# Patient Record
Sex: MALE | Race: WHITE | NOT HISPANIC OR LATINO | Employment: FULL TIME | URBAN - METROPOLITAN AREA
[De-identification: names, ages, dates, MRNs, and addresses within clinical notes are randomized per-mention and may not be internally consistent; named-entity substitution may affect disease eponyms.]

---

## 2022-07-18 ENCOUNTER — HOSPITAL ENCOUNTER (OUTPATIENT)
Dept: CARDIOLOGY | Facility: HOSPITAL | Age: 52
Discharge: HOME | End: 2022-07-18

## 2022-07-18 ENCOUNTER — TRANSCRIBE ORDERS (OUTPATIENT)
Dept: ADMINISTRATIVE | Facility: HOSPITAL | Age: 52
End: 2022-07-18

## 2022-07-18 ENCOUNTER — LAB (OUTPATIENT)
Dept: LAB | Facility: HOSPITAL | Age: 52
End: 2022-07-18

## 2022-07-18 DIAGNOSIS — Z01.818 PRE-OP TESTING: ICD-10-CM

## 2022-07-18 DIAGNOSIS — Z01.818 PRE-OP TESTING: Primary | ICD-10-CM

## 2022-07-18 LAB
ANION GAP SERPL CALCULATED.3IONS-SCNC: 12.4 MMOL/L (ref 5–15)
BUN SERPL-MCNC: 10 MG/DL (ref 6–20)
BUN/CREAT SERPL: 12.2 (ref 7–25)
CALCIUM SPEC-SCNC: 9.7 MG/DL (ref 8.6–10.5)
CHLORIDE SERPL-SCNC: 100 MMOL/L (ref 98–107)
CO2 SERPL-SCNC: 25.6 MMOL/L (ref 22–29)
CREAT SERPL-MCNC: 0.82 MG/DL (ref 0.76–1.27)
EGFRCR SERPLBLD CKD-EPI 2021: 105 ML/MIN/1.73
GLUCOSE SERPL-MCNC: 151 MG/DL (ref 65–99)
POTASSIUM SERPL-SCNC: 4.2 MMOL/L (ref 3.5–5.2)
SODIUM SERPL-SCNC: 138 MMOL/L (ref 136–145)

## 2022-07-18 PROCEDURE — 93010 ELECTROCARDIOGRAM REPORT: CPT | Performed by: INTERNAL MEDICINE

## 2022-07-18 PROCEDURE — 80048 BASIC METABOLIC PNL TOTAL CA: CPT

## 2022-07-18 PROCEDURE — 36415 COLL VENOUS BLD VENIPUNCTURE: CPT

## 2022-07-18 PROCEDURE — 93005 ELECTROCARDIOGRAM TRACING: CPT | Performed by: ORTHOPAEDIC SURGERY

## 2022-07-19 PROCEDURE — 93005 ELECTROCARDIOGRAM TRACING: CPT | Performed by: ORTHOPAEDIC SURGERY

## 2022-07-20 LAB — QT INTERVAL: 335 MS

## 2023-02-02 ENCOUNTER — APPOINTMENT (OUTPATIENT)
Dept: GENERAL RADIOLOGY | Facility: HOSPITAL | Age: 53
End: 2023-02-02
Payer: COMMERCIAL

## 2023-02-02 ENCOUNTER — HOSPITAL ENCOUNTER (EMERGENCY)
Facility: HOSPITAL | Age: 53
Discharge: HOME | End: 2023-02-02
Attending: EMERGENCY MEDICINE | Admitting: EMERGENCY MEDICINE
Payer: COMMERCIAL

## 2023-02-02 VITALS
SYSTOLIC BLOOD PRESSURE: 147 MMHG | BODY MASS INDEX: 28.93 KG/M2 | RESPIRATION RATE: 18 BRPM | HEART RATE: 70 BPM | DIASTOLIC BLOOD PRESSURE: 86 MMHG | TEMPERATURE: 97 F | WEIGHT: 180 LBS | HEIGHT: 66 IN | OXYGEN SATURATION: 100 %

## 2023-02-02 DIAGNOSIS — M54.41 ACUTE RIGHT-SIDED LOW BACK PAIN WITH RIGHT-SIDED SCIATICA: Primary | ICD-10-CM

## 2023-02-02 LAB
ANION GAP SERPL CALCULATED.3IONS-SCNC: 12 MMOL/L (ref 5–15)
BASOPHILS # BLD AUTO: 0.1 10*3/MM3 (ref 0–0.2)
BASOPHILS NFR BLD AUTO: 1 % (ref 0–1.5)
BILIRUB UR QL STRIP: NEGATIVE
BUN SERPL-MCNC: 13 MG/DL (ref 6–20)
BUN/CREAT SERPL: 17.1 (ref 7–25)
CALCIUM SPEC-SCNC: 9.3 MG/DL (ref 8.6–10.5)
CHLORIDE SERPL-SCNC: 105 MMOL/L (ref 98–107)
CLARITY UR: CLEAR
CO2 SERPL-SCNC: 20 MMOL/L (ref 22–29)
COLOR UR: YELLOW
CREAT SERPL-MCNC: 0.76 MG/DL (ref 0.76–1.27)
DEPRECATED RDW RBC AUTO: 41.6 FL (ref 37–54)
EGFRCR SERPLBLD CKD-EPI 2021: 107.5 ML/MIN/1.73
EOSINOPHIL # BLD AUTO: 0.1 10*3/MM3 (ref 0–0.4)
EOSINOPHIL NFR BLD AUTO: 1.1 % (ref 0.3–6.2)
ERYTHROCYTE [DISTWIDTH] IN BLOOD BY AUTOMATED COUNT: 12.5 % (ref 12.3–15.4)
GLUCOSE SERPL-MCNC: 133 MG/DL (ref 65–99)
GLUCOSE UR STRIP-MCNC: ABNORMAL MG/DL
HCT VFR BLD AUTO: 50.6 % (ref 37.5–51)
HGB BLD-MCNC: 16.9 G/DL (ref 13–17.7)
HGB UR QL STRIP.AUTO: NEGATIVE
KETONES UR QL STRIP: ABNORMAL
LEUKOCYTE ESTERASE UR QL STRIP.AUTO: NEGATIVE
LYMPHOCYTES # BLD AUTO: 3.1 10*3/MM3 (ref 0.7–3.1)
LYMPHOCYTES NFR BLD AUTO: 32.4 % (ref 19.6–45.3)
MCH RBC QN AUTO: 31.5 PG (ref 26.6–33)
MCHC RBC AUTO-ENTMCNC: 33.4 G/DL (ref 31.5–35.7)
MCV RBC AUTO: 94.3 FL (ref 79–97)
MONOCYTES # BLD AUTO: 0.6 10*3/MM3 (ref 0.1–0.9)
MONOCYTES NFR BLD AUTO: 5.9 % (ref 5–12)
NEUTROPHILS NFR BLD AUTO: 5.7 10*3/MM3 (ref 1.7–7)
NEUTROPHILS NFR BLD AUTO: 59.6 % (ref 42.7–76)
NITRITE UR QL STRIP: NEGATIVE
NRBC BLD AUTO-RTO: 0 /100 WBC (ref 0–0.2)
PH UR STRIP.AUTO: <=5 [PH] (ref 5–8)
PLATELET # BLD AUTO: 185 10*3/MM3 (ref 140–450)
PMV BLD AUTO: 8.4 FL (ref 6–12)
POTASSIUM SERPL-SCNC: 4 MMOL/L (ref 3.5–5.2)
PROT UR QL STRIP: NEGATIVE
RBC # BLD AUTO: 5.37 10*6/MM3 (ref 4.14–5.8)
SODIUM SERPL-SCNC: 137 MMOL/L (ref 136–145)
SP GR UR STRIP: 1.05 (ref 1–1.03)
UROBILINOGEN UR QL STRIP: ABNORMAL
WBC NRBC COR # BLD: 9.6 10*3/MM3 (ref 3.4–10.8)

## 2023-02-02 PROCEDURE — 99211 OFF/OP EST MAY X REQ PHY/QHP: CPT | Performed by: EMERGENCY MEDICINE

## 2023-02-02 PROCEDURE — 81003 URINALYSIS AUTO W/O SCOPE: CPT | Performed by: NURSE PRACTITIONER

## 2023-02-02 PROCEDURE — 99283 EMERGENCY DEPT VISIT LOW MDM: CPT

## 2023-02-02 PROCEDURE — 72110 X-RAY EXAM L-2 SPINE 4/>VWS: CPT

## 2023-02-02 PROCEDURE — 96372 THER/PROPH/DIAG INJ SC/IM: CPT

## 2023-02-02 PROCEDURE — 36415 COLL VENOUS BLD VENIPUNCTURE: CPT

## 2023-02-02 PROCEDURE — 25010000002 DEXAMETHASONE PER 1 MG: Performed by: NURSE PRACTITIONER

## 2023-02-02 PROCEDURE — 85025 COMPLETE CBC W/AUTO DIFF WBC: CPT | Performed by: NURSE PRACTITIONER

## 2023-02-02 PROCEDURE — 80048 BASIC METABOLIC PNL TOTAL CA: CPT | Performed by: NURSE PRACTITIONER

## 2023-02-02 RX ORDER — METHOCARBAMOL 500 MG/1
500 TABLET, FILM COATED ORAL AS NEEDED
COMMUNITY

## 2023-02-02 RX ORDER — ROSUVASTATIN CALCIUM 20 MG/1
20 TABLET, COATED ORAL DAILY
COMMUNITY

## 2023-02-02 RX ORDER — BUSPIRONE HYDROCHLORIDE 30 MG/1
30 TABLET ORAL 2 TIMES DAILY
COMMUNITY

## 2023-02-02 RX ORDER — GLIPIZIDE 5 MG/1
5 TABLET ORAL DAILY
COMMUNITY

## 2023-02-02 RX ORDER — LIDOCAINE 50 MG/G
1 PATCH TOPICAL ONCE
Status: DISCONTINUED | OUTPATIENT
Start: 2023-02-02 | End: 2023-02-03 | Stop reason: HOSPADM

## 2023-02-02 RX ORDER — MELOXICAM 15 MG/1
15 TABLET ORAL AS NEEDED
COMMUNITY

## 2023-02-02 RX ORDER — GABAPENTIN 300 MG/1
300 CAPSULE ORAL 3 TIMES DAILY
Qty: 21 CAPSULE | Refills: 0 | Status: SHIPPED | OUTPATIENT
Start: 2023-02-02 | End: 2023-02-09

## 2023-02-02 RX ORDER — DESVENLAFAXINE SUCCINATE 50 MG/1
50 TABLET, EXTENDED RELEASE ORAL DAILY
COMMUNITY

## 2023-02-02 RX ORDER — GABAPENTIN 100 MG/1
200 CAPSULE ORAL ONCE
Status: COMPLETED | OUTPATIENT
Start: 2023-02-02 | End: 2023-02-02

## 2023-02-02 RX ORDER — DEXAMETHASONE SODIUM PHOSPHATE 4 MG/ML
4 INJECTION, SOLUTION INTRA-ARTICULAR; INTRALESIONAL; INTRAMUSCULAR; INTRAVENOUS; SOFT TISSUE ONCE
Status: COMPLETED | OUTPATIENT
Start: 2023-02-02 | End: 2023-02-02

## 2023-02-02 RX ORDER — TRAMADOL HYDROCHLORIDE 50 MG/1
50 TABLET ORAL EVERY 6 HOURS PRN
COMMUNITY

## 2023-02-02 RX ORDER — TRAZODONE HYDROCHLORIDE 150 MG/1
150 TABLET ORAL NIGHTLY PRN
COMMUNITY

## 2023-02-02 RX ADMIN — DEXAMETHASONE SODIUM PHOSPHATE 4 MG: 4 INJECTION, SOLUTION INTRAMUSCULAR; INTRAVENOUS at 23:55

## 2023-02-02 RX ADMIN — GABAPENTIN 200 MG: 100 CAPSULE ORAL at 21:36

## 2023-02-02 RX ADMIN — LIDOCAINE 1 PATCH: 50 PATCH TOPICAL at 21:36

## 2023-02-03 NOTE — ED PROVIDER NOTES
"Subjective   History of Present Illness  52-year-old male presents with 1 week history of lumbar pain that radiates to the right SI joint.  He denies known injury.  He reports that he has had 2 episodes of \"hot flash, then puking\".  He reports he had a viral illness last week and laid in bed for 3 days prior to the pain.  He denies fever sweats chills.  Denies urinary symptoms.  Denies any urinary bowel incontinence saddle paresthesia.  He does report radiculopathy going to his right buttocks, right lateral anterior thigh.    Primary care: Fior Guevara NP    Orthospine: Dr. Deluca and MARIE Jalloh    1. Location: Lumbar  2. Quality: Burning  3. Severity: Moderate to severe  4. Worsening factors: Movement  5. Alleviating factors: Denies  6. Onset: 1 week  7. Radiation: Right SI joint, right buttock, right anterior lateral thigh  8. Frequency: Constant with periods of intensity   9. Co-morbidities:   Hyperlipidemia, diabetes mellitus type 2, anxiety, depression, cervical degenerative disc disease.      History provided by:  Patient and spouse   used: No        Review of Systems    Past Medical History:   Diagnosis Date   • Depression    • Diabetes mellitus (HCC)    • Migraine        No Known Allergies    Past Surgical History:   Procedure Laterality Date   • BACK SURGERY     • FRACTURE SURGERY         History reviewed. No pertinent family history.    Social History     Socioeconomic History   • Marital status:    Tobacco Use   • Smoking status: Every Day     Packs/day: 0.50     Years: 25.00     Pack years: 12.50     Types: Cigarettes   • Smokeless tobacco: Former     Types: Snuff   Vaping Use   • Vaping Use: Never used   Substance and Sexual Activity   • Alcohol use: Yes     Alcohol/week: 4.0 standard drinks     Types: 2 Cans of beer, 2 Shots of liquor per week   • Drug use: Never   • Sexual activity: Yes     Partners: Female           Objective   Physical Exam  Vitals and nursing note " reviewed.   Constitutional:       General: He is awake. He is not in acute distress.     Appearance: Normal appearance. He is well-developed and normal weight.   Cardiovascular:      Pulses: Normal pulses.   Musculoskeletal:         General: Tenderness present. No swelling, deformity or signs of injury.      Lumbar back: Tenderness and bony tenderness present. No swelling, edema, deformity or signs of trauma. Normal range of motion. Positive right straight leg raise test.        Back:    Skin:     General: Skin is warm and dry.      Capillary Refill: Capillary refill takes less than 2 seconds.      Coloration: Skin is not pale.   Neurological:      General: No focal deficit present.      Mental Status: He is alert and oriented to person, place, and time.      GCS: GCS eye subscore is 4. GCS verbal subscore is 5. GCS motor subscore is 6.      Sensory: Sensation is intact. No sensory deficit.      Motor: Motor function is intact. No weakness or abnormal muscle tone.      Gait: Gait normal.      Deep Tendon Reflexes: Reflexes normal.      Reflex Scores:       Patellar reflexes are 2+ on the right side and 2+ on the left side.  Psychiatric:         Mood and Affect: Mood normal.         Behavior: Behavior normal. Behavior is cooperative.         Thought Content: Thought content normal.         Judgment: Judgment normal.         Procedures           ED Course    XR Spine Lumbar Complete 4+VW    Result Date: 2/2/2023  Impression: Minimal degenerative changes, otherwise unremarkable exam. Electronically Signed: Peter Ricketts  2/2/2023 10:54 PM EST  Workstation ID: [id number]    Medications   lidocaine (LIDODERM) 5 % 1 patch (1 patch Transdermal Medication Applied 2/2/23 2136)   dexamethasone (DECADRON) injection 4 mg (has no administration in time range)   gabapentin (NEURONTIN) capsule 200 mg (200 mg Oral Given 2/2/23 2136)     Labs Reviewed   BASIC METABOLIC PANEL - Abnormal; Notable for the following components:        "Result Value    Glucose 133 (*)     CO2 20.0 (*)     All other components within normal limits    Narrative:     GFR Normal >60  Chronic Kidney Disease <60  Kidney Failure <15     URINALYSIS W/ MICROSCOPIC IF INDICATED (NO CULTURE) - Abnormal; Notable for the following components:    Specific Gravity, UA 1.049 (*)     Glucose, UA >=1000 mg/dL (3+) (*)     Ketones, UA Trace (*)     All other components within normal limits    Narrative:     Urine microscopic not indicated.   CBC WITH AUTO DIFFERENTIAL - Normal   CBC AND DIFFERENTIAL    Narrative:     The following orders were created for panel order CBC & Differential.  Procedure                               Abnormality         Status                     ---------                               -----------         ------                     CBC Auto Differential[[id number]]        Normal              Final result                 Please view results for these tests on the individual orders.                                            Medical Decision Making  Amount and/or Complexity of Data Reviewed  Labs: ordered.  Radiology: ordered.      Risk  Prescription drug management.        Chart Review:12/18/2021 patient was seen by pain management for degenerative disc disease of cervical spine    Comorbidity:  Past Medical History:   Diagnosis Date   • Depression    • Diabetes mellitus (HCC)    • Migraine         Imaging: Reviewed by this provider with the following findings: No compression fractures noted.  No acute osseous abnormalities.    Interpreted by radiologist as below:     XR Spine Lumbar Complete 4+VW    Result Date: 2/2/2023  Impression: Minimal degenerative changes, otherwise unremarkable exam. Electronically Signed: Peter Ricketts  2/2/2023 10:54 PM EST  Workstation ID: [id number]        /86   Pulse 70   Temp 97 °F (36.1 °C) (Oral)   Resp 18   Ht 167.6 cm (66\")   Wt 81.6 kg (180 lb)   SpO2 100%   BMI 29.05 kg/m²      Lab Results (last 24 hours)     " Procedure Component Value Units Date/Time    CBC & Differential [[id number]]  (Normal) Collected: 02/02/23 2150    Specimen: Blood Updated: 02/02/23 2153    Narrative:      The following orders were created for panel order CBC & Differential.  Procedure                               Abnormality         Status                     ---------                               -----------         ------                     CBC Auto Differential[[id number]]        Normal              Final result                 Please view results for these tests on the individual orders.    Basic Metabolic Panel [[id number]]  (Abnormal) Collected: 02/02/23 2150    Specimen: Blood Updated: 02/02/23 2213     Glucose 133 mg/dL      BUN 13 mg/dL      Creatinine 0.76 mg/dL      Sodium 137 mmol/L      Potassium 4.0 mmol/L      Chloride 105 mmol/L      CO2 20.0 mmol/L      Calcium 9.3 mg/dL      BUN/Creatinine Ratio 17.1     Anion Gap 12.0 mmol/L      eGFR 107.5 mL/min/1.73     Narrative:      GFR Normal >60  Chronic Kidney Disease <60  Kidney Failure <15      CBC Auto Differential [[id number]]  (Normal) Collected: 02/02/23 2150    Specimen: Blood Updated: 02/02/23 2153     WBC 9.60 10*3/mm3      RBC 5.37 10*6/mm3      Hemoglobin 16.9 g/dL      Hematocrit 50.6 %      MCV 94.3 fL      MCH 31.5 pg      MCHC 33.4 g/dL      RDW 12.5 %      RDW-SD 41.6 fl      MPV 8.4 fL      Platelets 185 10*3/mm3      Neutrophil % 59.6 %      Lymphocyte % 32.4 %      Monocyte % 5.9 %      Eosinophil % 1.1 %      Basophil % 1.0 %      Neutrophils, Absolute 5.70 10*3/mm3      Lymphocytes, Absolute 3.10 10*3/mm3      Monocytes, Absolute 0.60 10*3/mm3      Eosinophils, Absolute 0.10 10*3/mm3      Basophils, Absolute 0.10 10*3/mm3      nRBC 0.0 /100 WBC     Urinalysis With Microscopic If Indicated (No Culture) - Urine, Clean Catch [[id number]]  (Abnormal) Collected: 02/02/23 2221    Specimen: Urine, Clean Catch Updated: 02/02/23 2236     Color, UA Yellow     Appearance, UA  Clear     pH, UA <=5.0     Specific Gravity, UA 1.049     Glucose, UA >=1000 mg/dL (3+)     Ketones, UA Trace     Bilirubin, UA Negative     Blood, UA Negative     Protein, UA Negative     Leuk Esterase, UA Negative     Nitrite, UA Negative     Urobilinogen, UA 0.2 E.U./dL    Narrative:      Urine microscopic not indicated.           Medications   lidocaine (LIDODERM) 5 % 1 patch (1 patch Transdermal Medication Applied 2/2/23 2136)   dexamethasone (DECADRON) injection 4 mg (has no administration in time range)   gabapentin (NEURONTIN) capsule 200 mg (200 mg Oral Given 2/2/23 2136)        Patient undressed and placed in gown for exam.  Appropriate PPE worn during patient exam.  Appropriate monitoring initiated.  Patient is alert and oriented x3.  He is in mild pain distress.  Point tenderness noted over the lumbar spine.  There is no step-off deformity, erythema, ecchymosis noted.  He is noted to have pain associated with right straight leg raise.  He has radiculopathy down the right buttocks and thigh.  Motor function is decreased due to pain, sensation intact.  TR strong equal bilaterally 2+.  Distal pulses strong equal bilaterally 2+.  Cap refill less than 2 seconds.  Patient was given a above medications.  Ice pack was applied.  X-ray of lumbar spine obtained with above findings. Lab work-up was found to be essentially unremarkable.  Patient is diabetic.  He reports he has never tried taking steroids but was agreeable to monitor blood sugars closely.  He was given 1 dose of Decadron 4 mg IM.  He was discharged home on gabapentin 300 mg 3 times daily as needed for radiculopathy.  He was given exercises to perform.  He was encouraged to rotate heat and ice every 2 hours while awake.  He is established with orthospine and was encouraged to schedule follow-up.    Disposition/Treatment: Discussed results with patient, verbalized understanding.  Discussed reasons to return to the ER, patient verbalized understanding.   Agreeable with plan of care.  Patient was stable upon discharge.     Differential Diagnoses, not all-inclusive and does not constitute entirety of all causes: Sciatica, bulging disc, compression fracture.    Tests considered but not performed: MRI was considered, patient had no focal deficits present.    Upon reassessment, patient reports minimal relief with medications given.  He is in no acute distress.  He sitting upright in a chair at bedside.  Vital signs are stable.    Part of this note may be an electronic transcription/translation of spoken language to printed text using the Dragon Dictation System.       Final diagnoses:   Acute right-sided low back pain with right-sided sciatica       ED Disposition  ED Disposition     ED Disposition   Discharge    Condition   Stable    Comment   --             Britany Duncan, APRN  [address] [facility] [place] [address] [phone]    Schedule an appointment as soon as possible for a visit       Aristides Sin PA  [address]  [place] [address]  [phone]    Call in 1 day      [facility] EMERGENCY DEPARTMENT  [facility] [facility] [address] [phone]  Go to   If symptoms worsen         Medication List      New Prescriptions    gabapentin 300 MG capsule  Commonly known as: NEURONTIN  Take 1 capsule by mouth 3 (Three) Times a Day for 7 days.           Where to Get Your Medications      These medications were sent to [facility]/pharmacy #[id number] - [facility] [place] - [address] AT [facility] [address] [facility] [address] - [phone]  - [phone]   [address] [facility] [place] [address]    Phone: [phone]   · gabapentin 300 MG capsule          Diana Wright, APRN  02/02/23 3307

## 2023-02-03 NOTE — DISCHARGE INSTRUCTIONS
Monitor blood sugars daily.  Continue taking anti-inflammatory, muscle relaxers at home.  Take gabapentin as prescribed.  Make sure you are drinking at least 90 ounces of water daily.  Rotate heat and ice every 2 hours while awake, on for 20 minutes.  Schedule follow-up with primary care as needed.  Schedule follow-up with orthospine tomorrow.  Return to the ER for new or worsening symptoms.